# Patient Record
Sex: MALE | Race: BLACK OR AFRICAN AMERICAN | NOT HISPANIC OR LATINO | Employment: STUDENT | URBAN - METROPOLITAN AREA
[De-identification: names, ages, dates, MRNs, and addresses within clinical notes are randomized per-mention and may not be internally consistent; named-entity substitution may affect disease eponyms.]

---
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
## 2025-04-08 ENCOUNTER — OFFICE VISIT (OUTPATIENT)
Dept: URGENT CARE | Facility: CLINIC | Age: 20
End: 2025-04-08
Payer: MEDICAID

## 2025-04-08 VITALS
HEIGHT: 71 IN | TEMPERATURE: 99 F | HEART RATE: 59 BPM | RESPIRATION RATE: 18 BRPM | DIASTOLIC BLOOD PRESSURE: 66 MMHG | SYSTOLIC BLOOD PRESSURE: 107 MMHG | WEIGHT: 150.56 LBS | OXYGEN SATURATION: 98 % | BODY MASS INDEX: 21.08 KG/M2

## 2025-04-08 DIAGNOSIS — R11.0 NAUSEA: Primary | ICD-10-CM

## 2025-04-08 DIAGNOSIS — K52.9 GASTROENTERITIS: ICD-10-CM

## 2025-04-08 PROCEDURE — 74019 RADEX ABDOMEN 2 VIEWS: CPT | Mod: S$GLB,,, | Performed by: RADIOLOGY

## 2025-04-08 PROCEDURE — 99214 OFFICE O/P EST MOD 30 MIN: CPT | Mod: S$GLB,,, | Performed by: NURSE PRACTITIONER

## 2025-04-08 RX ORDER — ONDANSETRON 4 MG/1
4 TABLET, ORALLY DISINTEGRATING ORAL EVERY 8 HOURS PRN
Qty: 20 TABLET | Refills: 0 | Status: SHIPPED | OUTPATIENT
Start: 2025-04-08

## 2025-04-08 RX ORDER — SYRING-NEEDL,DISP,INSUL,0.3 ML 29 G X1/2"
296 SYRINGE, EMPTY DISPOSABLE MISCELLANEOUS ONCE
Qty: 1 EACH | Refills: 0 | Status: SHIPPED | OUTPATIENT
Start: 2025-04-08 | End: 2025-04-08 | Stop reason: ALTCHOICE

## 2025-04-08 NOTE — PROGRESS NOTES
"Subjective:      Patient ID: Isaiah Kate is a 20 y.o. male.    Vitals:  height is 5' 11" (1.803 m) and weight is 68.3 kg (150 lb 9.2 oz). His oral temperature is 98.7 °F (37.1 °C). His blood pressure is 107/66 and his pulse is 59 (abnormal). His respiration is 18 and oxygen saturation is 98%.     Chief Complaint: Abdominal Pain    20 y.o. pt came in today with abdominal issues, nausea, diarrhea, loss of appetite that started Saturday. Pt states saturday he started cramping, then Sunday it turned into abdominal pain and nausea feeling. States at first he was constipated then has had diarrhea since Sunday. States on Monday his stool turned black but no blood in his stool. Pt has taken pepto bismol; tried a rice, bread, and fruit diet; and drank electrolytes all of which provided no relief. States he has gi issues like every month. Has taken a box of Pepto bismol.       Abdominal Pain  This is a new problem. The current episode started in the past 7 days. The onset quality is sudden. The problem occurs intermittently. The problem has been gradually worsening. The pain is located in the suprapubic region, RLQ and LLQ. The pain is at a severity of 8/10. The pain is severe. The quality of the pain is aching, burning and sharp. Pain radiation: radiates upward. Associated symptoms include belching, diarrhea, headaches and nausea. Pertinent negatives include no anorexia, arthralgias, constipation, dysuria, fever, flatus, frequency, hematochezia, hematuria, melena, myalgias, vomiting or weight loss. The pain is aggravated by movement. He has tried antacids for the symptoms. The treatment provided no relief.       Constitution: Negative for fever.   Gastrointestinal:  Positive for abdominal pain, nausea and diarrhea. Negative for vomiting, constipation and bright red blood in stool.   Genitourinary:  Negative for dysuria, frequency and hematuria.   Musculoskeletal:  Negative for joint pain and muscle ache.   Neurological:  " Positive for headaches.      Objective:     Physical Exam   Constitutional: He is oriented to person, place, and time. He appears well-developed. He is cooperative.  Non-toxic appearance. He does not appear ill. No distress.   HENT:   Head: Normocephalic and atraumatic.   Ears:   Right Ear: Hearing, tympanic membrane, external ear and ear canal normal.   Left Ear: Hearing, tympanic membrane, external ear and ear canal normal.   Nose: Nose normal. No mucosal edema, rhinorrhea or nasal deformity. No epistaxis. Right sinus exhibits no maxillary sinus tenderness and no frontal sinus tenderness. Left sinus exhibits no maxillary sinus tenderness and no frontal sinus tenderness.   Mouth/Throat: Uvula is midline, oropharynx is clear and moist and mucous membranes are normal. No trismus in the jaw. Normal dentition. No uvula swelling. No oropharyngeal exudate, posterior oropharyngeal edema or posterior oropharyngeal erythema.   Eyes: Conjunctivae and lids are normal. No scleral icterus.   Neck: Trachea normal and phonation normal. Neck supple. No edema present. No erythema present. No neck rigidity present.   Cardiovascular: Normal rate, regular rhythm, normal heart sounds and normal pulses.   Pulmonary/Chest: Effort normal and breath sounds normal. No respiratory distress. He has no decreased breath sounds. He has no rhonchi.   Abdominal: Normal appearance.   Musculoskeletal: Normal range of motion.         General: No deformity. Normal range of motion.   Neurological: He is alert and oriented to person, place, and time. He exhibits normal muscle tone. Coordination normal.   Skin: Skin is warm, dry, intact, not diaphoretic and not pale.   Psychiatric: His speech is normal and behavior is normal. Judgment and thought content normal.   Nursing note and vitals reviewed.      Assessment:     1. Nausea    2. Gastroenteritis        Plan:       Nausea  -     X-Ray Abdomen Flat And Erect; Future; Expected date: 04/08/2025  -      ondansetron (ZOFRAN-ODT) 4 MG TbDL; Take 1 tablet (4 mg total) by mouth every 8 (eight) hours as needed (nausea).  Dispense: 20 tablet; Refill: 0  -     Ambulatory referral/consult to Gastroenterology    Gastroenteritis    Other orders  -     Discontinue: magnesium citrate solution; Take 296 mLs by mouth once. for 1 dose  Dispense: 1 each; Refill: 0      FINDINGS:     No comparison studies are available.  There is fluid throughout nondilated loops of large and small bowel. The abdominal gas pattern is otherwise normal. No sign of bowel dilation, air/fluid levels or free air.     The lung bases are clear.  Convex left curvature the midthoracic spine.  Postoperative changes involve the left superior acetabulum.        Impression:     1.  Fluid is present throughout nondilated loops of large and small bowel.  Nonspecific finding.  Gastroenteritis or other diarrheal diseases could have this appearance in the right clinical setting.  2.  Incidental findings as noted above.  Negative for acute process otherwise.     Finalized on: 4/8/2025 2:34 PM By:  Nigel Murillo MD  [facility]# [id number]      2025-04-08 14:36:[id number]     [facility]     Dark stool likely related to taking large doses of Pepto.    Initially was going to treat for constipation. After x ray read concern for bowel inflammation. States he has had ongoing issues. Did [facility] referral but may be insurance issues. Has primary care at [facility], States he will follow up with them. Given information for the [facility]. Strict ED precautions discussed.

## 2025-04-08 NOTE — LETTER
April 8, 2025      [facility] [address] [place] [address]  Phone: [phone]  Fax: [phone]       Patient: Isaiah Kate   YOB: 2005  Date of Visit: 04/08/2025    To Whom It May Concern:    Gio Kate  was at [facility] on 04/08/2025. The patient may return to work/school when fever for 24 hrs and symptoms resolving.  If you have any questions or concerns, or if I can be of further assistance, please do not hesitate to contact me.    Sincerely,     JENNIFER Marcus

## (undated) DEVICE — Device

## (undated) DEVICE — ALCOHOL 70% ANTISEPTIC ISO 4OZ

## (undated) DEVICE — NDL SPINAL 18GX3.5 SPINOCAN

## (undated) DEVICE — NDL 26.5 TAPR CRV XLOOP

## (undated) DEVICE — SEE MEDLINE ITEM 157216

## (undated) DEVICE — GLOVE SURGEON SYN PF SZ 9

## (undated) DEVICE — GLOVE SURGICAL LATEX SZ 7

## (undated) DEVICE — GOWN SMARTGOWN LVL4 X-LONG XL

## (undated) DEVICE — SUT VICRYL 3-0 27 SH

## (undated) DEVICE — SYR 10CC LUER LOCK

## (undated) DEVICE — DRAPE T EXTRM SURG 121X128X90

## (undated) DEVICE — SHAVER EXCALIBUR 5.5MM 13CM

## (undated) DEVICE — GOWN SMART IMP BREATHABLE XXLG

## (undated) DEVICE — SOL IRR NACL .9% 3000ML

## (undated) DEVICE — TOURNIQUET SB QC DP 34X4IN

## (undated) DEVICE — SUT BLU BR 2 TAPERD NDL 1/2

## (undated) DEVICE — SUT ETHILON 3-0 PS2 18 BLK

## (undated) DEVICE — PAD CAST SPECIALIST STRL 6

## (undated) DEVICE — SUT FIBERTAPE 1.3MM TAILS

## (undated) DEVICE — SUT BONE WAX 2.5 GRMS 12/BX

## (undated) DEVICE — BLADE SHAVER TORPEDO 4MMX13CM

## (undated) DEVICE — TAPE SURGICAL MICROFOAM 3IN

## (undated) DEVICE — SOL 9P NACL IRR PIC IL

## (undated) DEVICE — MAT SURGICAL ECOSUCTIONER

## (undated) DEVICE — BURR RND CARBIDE 4MM

## (undated) DEVICE — TAPE SURG MEDIPORE 6X72IN

## (undated) DEVICE — SUT FIBERWIRE LOOP TIGER 2

## (undated) DEVICE — DRAPE C-ARMOR EQUIPMENT COVER

## (undated) DEVICE — COVER CAMERA OPERATING ROOM

## (undated) DEVICE — UNDERGLOVES BIOGEL PI SZ 6 LF

## (undated) DEVICE — DRAPE INCISE IOBAN 2 23X17IN

## (undated) DEVICE — DRESSING XEROFORM FOIL PK 1X8

## (undated) DEVICE — SUT 0 VICRYL / CT-1

## (undated) DEVICE — DRAPE STERI-DRAPE 1000 17X11IN

## (undated) DEVICE — KIT PORTAL ENTRY

## (undated) DEVICE — GAUZE SPONGE 4X4 12PLY

## (undated) DEVICE — POSITIONER IV ARMBOARD FOAM

## (undated) DEVICE — PAD ELECTRODE STER 1.5X3

## (undated) DEVICE — BLADE SHAVER PREBENT 4.2MM

## (undated) DEVICE — MANIFOLD 4 PORT

## (undated) DEVICE — WRAPON POLAR PAD HIP FOR POLAR

## (undated) DEVICE — BLADE COOLCUT BNE CUT 4MMX13CM

## (undated) DEVICE — SHAVER SABRETOOTH 4MMX12.5CM

## (undated) DEVICE — SHAVER SABRETOOTH CRV 4MMX13CM

## (undated) DEVICE — DRAPE U SPLIT SHEET 54X76IN

## (undated) DEVICE — INSTRUMENT FRAZIER 10FR W/VENT

## (undated) DEVICE — GLOVE PROTEXIS LTX  8.5

## (undated) DEVICE — GOWN POLY REINF BRTH SLV XL

## (undated) DEVICE — NDL HYPODERMIC BLUNT 18G 1.5IN

## (undated) DEVICE — SUT 3-0 MONOCRYL PLUS PS-2

## (undated) DEVICE — COVER LIGHT HANDLE 80/CA

## (undated) DEVICE — CANNULA FLOWPORT OBTURATOR

## (undated) DEVICE — TUBING PUMP ARTHROSCOPY STRL

## (undated) DEVICE — HDS DISPOSABLE PAD KIT

## (undated) DEVICE — SUT 2 20 FIBERLOOP STR NDL

## (undated) DEVICE — PACK HIP ARTHROSCOPY CUSTOM

## (undated) DEVICE — UNDERGLOVES BIOGEL PI SZ 7 LF

## (undated) DEVICE — ADHESIVE MASTISOL VIAL 48/BX

## (undated) DEVICE — DRAPE STERI INSTRUMENT 1018

## (undated) DEVICE — DRAPE MINI C-ARM

## (undated) DEVICE — GLOVE SURG ULTRA TOUCH 6

## (undated) DEVICE — APPLICATOR CHLORAPREP ORN 26ML

## (undated) DEVICE — BRACE KNEE T SCOPE PREMIER

## (undated) DEVICE — TOWEL OR DISP STRL BLUE 4/PK

## (undated) DEVICE — PROBE ARTHSCP EDGE ENERGY 50

## (undated) DEVICE — COVER PROXIMA MAYO STAND

## (undated) DEVICE — SUT FIBERWIRE 0 1.5IN CLOSE LP

## (undated) DEVICE — STRIP STERI REIN CLSR 1/2X2IN

## (undated) DEVICE — UNDERGLOVES BIOGEL PI SIZE 7.5

## (undated) DEVICE — TAPE SILK 3IN

## (undated) DEVICE — BANDAGE ACE DOUBLE STER 6IN

## (undated) DEVICE — ELECTRODE REM PLYHSV RETURN 9

## (undated) DEVICE — PACK BASIC SETUP SC BR

## (undated) DEVICE — TUBE SET INFLOW/OUTFLOW

## (undated) DEVICE — SUT VICRYL PLUS 0 CT1 18IN

## (undated) DEVICE — UNDERGLOVES BIOGEL PI SIZE 8.5

## (undated) DEVICE — DRAPE THREE-QTR REINF 53X77IN

## (undated) DEVICE — SUT MCRYL PLUS 3-0 PS2 27IN

## (undated) DEVICE — BLADE SHAVER ARTHRO 4.2X19CM

## (undated) DEVICE — DISPOSABLE KIT, TRANSTIBIAL ACL WITH SAW BLADE

## (undated) DEVICE — SEE MEDLINE ITEM 152530

## (undated) DEVICE — SUT CTD VICRYL 0 UND BR SUT

## (undated) DEVICE — SUT MCRYL PLUS 4-0 PS2 27IN

## (undated) DEVICE — POSITIONER HEAD DONUT 9IN FOAM

## (undated) DEVICE — TUBING SUCTION STRAIGHT .25X20

## (undated) DEVICE — GLOVE BIOGEL SKINSENSE PI 7.0

## (undated) DEVICE — SUT VICRYL 2-0 36 CT-1

## (undated) DEVICE — COVER TABLE HVY DTY 60X90IN

## (undated) DEVICE — DRAPE ISOLATION 2ML

## (undated) DEVICE — PAD ABD 8X10 STERILE

## (undated) DEVICE — PROBE MULTI PORT RF 90 DEGREE

## (undated) DEVICE — BNDG COFLEX FOAM LF2 ST 4X5YD

## (undated) DEVICE — SUT FIBERWIRE

## (undated) DEVICE — BANDAGE ESMARK ELASTIC ST 6X9

## (undated) DEVICE — BIT DRILL CANN QC 2.7X160 SS

## (undated) DEVICE — COLLECTOR GRAFTNET TISS AUTOLG

## (undated) DEVICE — DRAPE C-ARM ELAS CLIP 42X120IN

## (undated) DEVICE — BLADE SURG CARBON STEEL #10

## (undated) DEVICE — CLOSURE SKIN STERI STRIP 1/2X4

## (undated) DEVICE — PASSER SUTURE SCORPION 3.2MM

## (undated) DEVICE — TOURNIQUET SB QC DP 44X4IN

## (undated) DEVICE — TIP SUCTION YANKAUER

## (undated) DEVICE — DRESSING TEGADERM IV 3.5 X 4.5

## (undated) DEVICE — SLINGSHOT 70DEG UP

## (undated) DEVICE — PASSER SUTURELASSO MICROSURG

## (undated) DEVICE — SUPPORT ULNA NERVE PROTECTOR

## (undated) DEVICE — HIP ARTHSCP MASTER DISP KIT

## (undated) DEVICE — TAPE MEDIPORE 3 X 10YD

## (undated) DEVICE — SUT VICRYL PLUS 2-0 CT1 18

## (undated) DEVICE — SUT PROLENE 3-0 PS-2 BL 18IN

## (undated) DEVICE — KWIRE NTHRD 1.25X150MM
Type: IMPLANTABLE DEVICE | Site: HIP | Status: NON-FUNCTIONAL
Removed: 2020-10-28

## (undated) DEVICE — GLOVE ORTHO PF SZ 8.5